# Patient Record
Sex: MALE | Race: WHITE
[De-identification: names, ages, dates, MRNs, and addresses within clinical notes are randomized per-mention and may not be internally consistent; named-entity substitution may affect disease eponyms.]

---

## 2019-07-13 ENCOUNTER — HOSPITAL ENCOUNTER (EMERGENCY)
Dept: HOSPITAL 59 - ER | Age: 84
Discharge: HOME | End: 2019-07-13
Payer: MEDICARE

## 2019-07-13 DIAGNOSIS — M79.12: Primary | ICD-10-CM

## 2019-07-13 DIAGNOSIS — M25.511: ICD-10-CM

## 2019-07-13 LAB
ABSOLUTE NEUTROPHIL COUNT: 8.09
ALBUMIN SERPL-MCNC: 4.2 G/DL (ref 4–5)
ALBUMIN/GLOB SERPL: 1.6 {RATIO} (ref 1.1–1.8)
ALP SERPL-CCNC: 74 U/L (ref 40–129)
ALT SERPL-CCNC: 25 U/L (ref ?–41)
ANION GAP SERPL CALC-SCNC: 13 MMOL/L (ref 7–16)
AST SERPL-CCNC: 22 U/L (ref 10–50)
BASOPHILS NFR BLD: 0.3 % (ref 0–6)
BILIRUB SERPL-MCNC: 0.4 MG/DL (ref 0.2–1)
BUN SERPL-MCNC: 26 MG/DL (ref 8–23)
CO2 SERPL-SCNC: 29 MMOL/L (ref 22–29)
CREAT SERPL-MCNC: 1.2 MG/DL (ref 0.7–1.2)
CRP SERPL-MCNC: 0.96 MG/DL (ref ?–0.5)
EOSINOPHIL NFR BLD: 2.7 % (ref 0–6)
ERYTHROCYTE [DISTWIDTH] IN BLOOD BY AUTOMATED COUNT: 14.9 % (ref 11.5–14.5)
EST GLOMERULAR FILTRATION RATE: > 60 ML/MIN
GLOBULIN SER-MCNC: 2.7 GM/DL (ref 1.4–4.8)
GLUCOSE SERPL-MCNC: 100 MG/DL (ref 74–109)
GRANULOCYTES NFR BLD: 73.9 % (ref 47–80)
HCT VFR BLD CALC: 49.1 % (ref 42–52)
HGB BLD-MCNC: 16.2 GM/DL (ref 14–18)
LYMPHOCYTES NFR BLD AUTO: 14.1 % (ref 16–45)
MCH RBC QN AUTO: 30.9 PG (ref 27–33)
MCHC RBC AUTO-ENTMCNC: 33 G/DL (ref 32–36)
MCV RBC AUTO: 93.5 FL (ref 81–97)
MONOCYTES NFR BLD: 9 % (ref 0–9)
PLATELET # BLD: 243 K/UL (ref 130–400)
PMV BLD AUTO: 9.8 FL (ref 7.4–10.4)
PROT SERPL-MCNC: 6.9 G/DL (ref 6.6–8.7)
RBC # BLD AUTO: 5.25 M/UL (ref 4.4–5.7)
WBC # BLD AUTO: 10.9 K/UL (ref 4.2–12.2)

## 2019-07-13 PROCEDURE — 80053 COMPREHEN METABOLIC PANEL: CPT

## 2019-07-13 PROCEDURE — 96375 TX/PRO/DX INJ NEW DRUG ADDON: CPT

## 2019-07-13 PROCEDURE — 96365 THER/PROPH/DIAG IV INF INIT: CPT

## 2019-07-13 PROCEDURE — 72050 X-RAY EXAM NECK SPINE 4/5VWS: CPT

## 2019-07-13 PROCEDURE — 99284 EMERGENCY DEPT VISIT MOD MDM: CPT

## 2019-07-13 PROCEDURE — 85025 COMPLETE CBC W/AUTO DIFF WBC: CPT

## 2019-07-13 PROCEDURE — 86140 C-REACTIVE PROTEIN: CPT

## 2019-07-13 NOTE — EMERGENCY DEPARTMENT RECORD
History of Present Illness





- General


Chief complaint: Extremity Problem


Stated complaint: RT SHOULDER/BACK OF NECK


Time Seen by Provider: 19 17:07


Source: Patient


Mode of Arrival: Ambulatory


Limitations: No limitations





- History of Present Illness


Initial comments: 


The patient is here due to R posterior lower neck pain for the last 13 hours. He

woke up at 4am with the pain. It intermittently radiates to the R shoulder. The 

patient denies any HA, trauma, fever, injury, arm numbness, weakness, tingling 

or any bowel or bladder issues. The patient has had similar pain to the lower 

back with sciatica. The patient states the pain is much worse with any head and 

neck rotation. The patient also denied any CP, SOB, EULOGIO, sweating or nausea. 





MD Complaint: Neck Pain


Onset/Timin


-: Hour(s)


Location: Right, Arm


History of Same: No


Radiation: None


Severity scale (1-10): 10


Quality: Sharp


Consistency: Constant


Improves with: Rest


Worsens with: Walking, Weight bearing





- Related Data


                                Home Medications











 Medication  Instructions  Recorded  Confirmed  Last Taken


 


Aspirin [Aspirin EC] 325 mg PO DAILY 19


 


Atorvastatin Calcium 20 mg PO DAILY 19 Unknown


 


Ergocalciferol (Vitamin D2) 25 mcg PO DAILY 19





[Vitamin D2]    


 


Lamotrigine [Lamictal] 150 mg PO DAILY 19 Unknown


 


Levothyroxine Sodium 88 mcg PO DAILY 19








                                  Previous Rx's











 Medication  Instructions  Recorded


 


Cyclobenzaprine HCl [Flexeril] 10 mg PO QHS PRN #10 tablet 19


 


Prednisone [Prednisone 20Mg] 40 mg PO DAILY #8 tab 19











                                    Allergies











Allergy/AdvReac Type Severity Reaction Status Date / Time


 


No Known Drug Allergies Allergy   Verified 19 17:17














Travel Screening





- Travel/Exposure Within Last 30 Days


Have you traveled within the last 30 days?: No





- Travel Symptoms


Symptom Screening: None





Review of Systems


Constitutional: Denies: Chills, Fever


Eyes: Denies: Eye discharge


Respiratory: Denies: Cough, Dyspnea


Cardiovascular: Denies: Arrhythmia, Chest pain, Dyspnea on exertion


Endocrine: Denies: Fatigue





Past Medical History





- SOCIAL HISTORY


Smoking Status: Never smoker


Alcohol Use: Rare


Drug Use: None





- RESPIRATORY


Hx Respiratory Disorders: No





- CARDIOVASCULAR


Hx Cardio Disorders: No





- NEURO


Hx Neuro Disorders: Yes


Hx CVA: Yes


Hx Seizures: Yes





- GI


Hx GI Disorders: No





- 


Hx Genitourinary Disorders: No





- ENDOCRINE


Hx Endocrine Disorders: Yes


Hx Thyroid Disease: Yes


Comment:: hypothyroid





- MUSCULOSKELETAL


Hx Musculoskeletal Disorders: No





- PSYCH


Hx Psych Problems: Yes


Hx Anxiety: Yes


Hx Depression: Yes





- HEMATOLOGY/ONCOLOGY


Hx Hematology/Oncology Disorders: No





Family Medical History


Any Significant Family History?: No





Physical Exam





- General


General Appearance: Alert, Oriented x3, Cooperative, No acute distress





- Head


Head exam: Atraumatic, Normocephalic, Normal inspection





- Eye


Eye exam: Normal appearance, PERRL





- Neck


Neck exam: Normal inspection, Tenderness (The Neck pain is 100% reproducible 

with palpation to the R inferior posterior cervical area near C6-7.).  negative:

Full ROM, Lymphadenopathy, Meningismus





- Respiratory


Respiratory exam: Normal lung sounds bilaterally.  negative: Respiratory 

distress





- Cardiovascular


Cardiovascular Exam: Regular rate, Normal rhythm, Normal heart sounds





- GI/Abdominal


GI/Abdominal exam: Soft, Normal bowel sounds.  negative: Tenderness





- Extremities


Extremities exam: Normal inspection, Full ROM, Normal capillary refill, Other 

(radial pulses 2+ and equal bilaterally.).  negative: Tenderness


Image of Full Body: 


                            __________________________














                            __________________________





 1 - Area of pain and very reproducible tenderness.








- Back


Back exam: Reports: Normal inspection.  Denies: Vertebral tenderness





- Neurological


Neurological exam: Alert, Normal gait, Oriented X3, Reflexes normal.  negative: 

Abnormal gait, Altered, Motor sensory deficit (Motor and sensory are 5/5 and 

equal to the bilateral upper extremities.)





- Psychiatric


Psychiatric exam: negative: Anxious





Course





                                   Vital Signs











  19





  17:02


 


Temperature 97.7 F


 


Pulse Rate 72


 


Respiratory 20





Rate 


 


Blood Pressure 160/107


 


Pulse Ox 98














- Reevaluation(s)


Reevaluation #1: 


The patient is doing a lot better at this time. The pain has now has pretty much

resolved. The patient is up walking with no pain or discomfort and is able to 

lie down on the bed and sit up with no difficulty or pain. He still is having 

some mild pain with ROM of his head and neck. There is no pain radiating down 

his R arm and the patient has normal ROM of the R arm and shoulder with no 

weakness or numbness. It appears the patient has most likely a pinched nerve in 

the R lower cervical region with muscle spasm. We will discharge the patient 

with Flexeril and Prednisone and he is to see his PCP early this week for 

recheck.


19 18:30





Reevaluation #2: 


The patient continues to be SIGNIFICANTLY improved at this time and is sitting 

on the bed very comfortable. His significant other in the room will be with him 

tonight and the patient will be discharged on Flexeril which has helped him in 

the pat. The patient's friend did state he was so severe at home she was 

concerned that the pain could be from his heart. I did explain that due to the 

fact it was SO reproducible to palpation and neck rotation I was confident it 

was not. Even so I did recommend obtaining an EKG and cardiac enzymes to be sure

of that fact but the patient refused that plan. The patient is confident that 

this is not his heart and continued to refuse the tests. He understands we will 

not be able to diagnose any heart issue without them and is certain it is not 

his heart causing the symptoms.


19 18:42








Medical Decision Making





- Lab Data


Result diagrams: 


                                 19 17:30





                                 19 17:30





Disposition


Disposition: Discharge


Clinical Impression: 


 Cervical myofascial pain syndrome





Disposition: Home, Self-Care


Condition: (2) Stable


Instructions:  Acute Neck Pain (ED)


Additional Instructions: 


Please take your home Tylenol and Motrin for pain and continue the Prednisone 

tomorrow. Please take the Flexeril at night only. Please see your family doctor 

for recheck early next week and return to the ER for any return of the pain, 

fever, chest pain, shortness of breath or any trouble breathing.


Prescriptions: 


Cyclobenzaprine HCl [Flexeril] 10 mg PO QHS PRN #10 tablet


 PRN Reason: Pain


Prednisone [Prednisone 20Mg] 40 mg PO DAILY #8 tab


Forms:  Patient Portal Access


Time of Disposition: 18:35





Quality





- Quality Measures


Quality Measures: N/A





- Blood Pressure Screening


View Details: Yes


Does Patient Have Any of the Following: No


Blood Pressure Classification: Hypertensive Reading


Systolic Measurement: 165


Diastolic Measurement: 108


Screening for High Blood Pressure: < First Hypertensive BP, F/U Documented > 

[]


First Hypertensive Follow-up Interventions: Referral to alternative/primary care

 provider.